# Patient Record
Sex: MALE | Race: WHITE
[De-identification: names, ages, dates, MRNs, and addresses within clinical notes are randomized per-mention and may not be internally consistent; named-entity substitution may affect disease eponyms.]

---

## 2019-07-30 ENCOUNTER — APPOINTMENT (OUTPATIENT)
Dept: INTERNAL MEDICINE | Facility: CLINIC | Age: 34
End: 2019-07-30
Payer: MEDICAID

## 2019-07-30 VITALS
HEART RATE: 78 BPM | TEMPERATURE: 98.5 F | SYSTOLIC BLOOD PRESSURE: 110 MMHG | HEIGHT: 70 IN | WEIGHT: 174 LBS | OXYGEN SATURATION: 95 % | BODY MASS INDEX: 24.91 KG/M2 | RESPIRATION RATE: 14 BRPM | DIASTOLIC BLOOD PRESSURE: 60 MMHG

## 2019-07-30 DIAGNOSIS — Z82.49 FAMILY HISTORY OF ISCHEMIC HEART DISEASE AND OTHER DISEASES OF THE CIRCULATORY SYSTEM: ICD-10-CM

## 2019-07-30 PROCEDURE — 99204 OFFICE O/P NEW MOD 45 MIN: CPT

## 2019-07-30 RX ORDER — CLONIDINE HYDROCHLORIDE 0.2 MG/1
0.2 TABLET ORAL
Qty: 60 | Refills: 1 | Status: ACTIVE | COMMUNITY

## 2019-07-30 RX ORDER — BUPRENORPHINE HYDROCHLORIDE, NALOXONE HYDROCHLORIDE 8; 2 MG/1; MG/1
8-2 FILM, SOLUBLE BUCCAL; SUBLINGUAL
Qty: 90 | Refills: 0 | Status: ACTIVE | COMMUNITY

## 2019-07-30 RX ORDER — CLONAZEPAM 1 MG/1
1 TABLET ORAL TWICE DAILY
Qty: 60 | Refills: 0 | Status: ACTIVE | COMMUNITY

## 2019-07-30 NOTE — HEALTH RISK ASSESSMENT
[Good] : ~his/her~ current health as good [] : Yes [No falls in past year] : Patient reported no falls in the past year [No] : In the past 12 months have you used drugs other than those required for medical reasons? No [0] : 1) Little interest or pleasure doing things: Not at all (0)

## 2019-07-30 NOTE — PHYSICAL EXAM
[Well Nourished] : well nourished [No Acute Distress] : no acute distress [Well Developed] : well developed [Well-Appearing] : well-appearing [PERRL] : pupils equal round and reactive to light [EOMI] : extraocular movements intact [Normal Sclera/Conjunctiva] : normal sclera/conjunctiva [No JVD] : no jugular venous distention [Normal Oropharynx] : the oropharynx was normal [Normal Outer Ear/Nose] : the outer ears and nose were normal in appearance [Thyroid Normal, No Nodules] : the thyroid was normal and there were no nodules present [No Lymphadenopathy] : no lymphadenopathy [Supple] : supple [Clear to Auscultation] : lungs were clear to auscultation bilaterally [No Accessory Muscle Use] : no accessory muscle use [No Respiratory Distress] : no respiratory distress  [Normal Rate] : normal rate  [Regular Rhythm] : with a regular rhythm [No Murmur] : no murmur heard [No Carotid Bruits] : no carotid bruits [Normal S1, S2] : normal S1 and S2 [No Varicosities] : no varicosities [No Abdominal Bruit] : a ~M bruit was not heard ~T in the abdomen [No Palpable Aorta] : no palpable aorta [No Edema] : there was no peripheral edema [Pedal Pulses Present] : the pedal pulses are present [No Extremity Clubbing/Cyanosis] : no extremity clubbing/cyanosis [Non Tender] : non-tender [Soft] : abdomen soft [No Masses] : no abdominal mass palpated [No HSM] : no HSM [Non-distended] : non-distended [Normal Bowel Sounds] : normal bowel sounds [Normal Anterior Cervical Nodes] : no anterior cervical lymphadenopathy [Normal Posterior Cervical Nodes] : no posterior cervical lymphadenopathy [No Joint Swelling] : no joint swelling [No Spinal Tenderness] : no spinal tenderness [No CVA Tenderness] : no CVA  tenderness [Grossly Normal Strength/Tone] : grossly normal strength/tone [No Rash] : no rash [Normal Gait] : normal gait [Coordination Grossly Intact] : coordination grossly intact [No Focal Deficits] : no focal deficits [Deep Tendon Reflexes (DTR)] : deep tendon reflexes were 2+ and symmetric [Normal Insight/Judgement] : insight and judgment were intact [Normal Affect] : the affect was normal

## 2019-07-31 ENCOUNTER — APPOINTMENT (OUTPATIENT)
Dept: INTERNAL MEDICINE | Facility: CLINIC | Age: 34
End: 2019-07-31
Payer: MEDICAID

## 2019-07-31 VITALS
WEIGHT: 180 LBS | BODY MASS INDEX: 25.77 KG/M2 | HEART RATE: 74 BPM | OXYGEN SATURATION: 97 % | HEIGHT: 70 IN | SYSTOLIC BLOOD PRESSURE: 134 MMHG | DIASTOLIC BLOOD PRESSURE: 82 MMHG | RESPIRATION RATE: 16 BRPM

## 2019-07-31 DIAGNOSIS — F19.10 OTHER PSYCHOACTIVE SUBSTANCE ABUSE, UNCOMPLICATED: ICD-10-CM

## 2019-07-31 DIAGNOSIS — N52.9 MALE ERECTILE DYSFUNCTION, UNSPECIFIED: ICD-10-CM

## 2019-07-31 DIAGNOSIS — F41.9 ANXIETY DISORDER, UNSPECIFIED: ICD-10-CM

## 2019-07-31 DIAGNOSIS — F32.9 ANXIETY DISORDER, UNSPECIFIED: ICD-10-CM

## 2019-07-31 PROCEDURE — 99214 OFFICE O/P EST MOD 30 MIN: CPT

## 2019-07-31 NOTE — COUNSELING
[de-identified] : Advised connecting with [facility] to try to get psych referral / appmt\par Previously patient [place] (and elsewhere)   advised reconnecting there as possible source of help to him...

## 2019-07-31 NOTE — HISTORY OF PRESENT ILLNESS
[FreeTextEntry1] : Here today looking for physician to prescribe for his anxiety / depression\par \par \par  [de-identified] : Long hx   multi substance abuse disorder\par On suboxone via program ([facility]) x 8 mos\par    drug tests 2x / w\par     MD Guerrero\par     Therapist / counselor "Ginger" \par \par Reports generalized anxiety x yrs   on meds\par "Can't find psychiatrist" \par Anticipates program he is in will "detox" "withdraw" bernzos he is taking

## 2022-07-09 ENCOUNTER — EMERGENCY (EMERGENCY)
Facility: HOSPITAL | Age: 37
LOS: 1 days | Discharge: HOME | End: 2022-07-09
Attending: STUDENT IN AN ORGANIZED HEALTH CARE EDUCATION/TRAINING PROGRAM
Payer: SELF-PAY

## 2022-07-09 ENCOUNTER — EMERGENCY (EMERGENCY)
Facility: HOSPITAL | Age: 37
LOS: 1 days | Discharge: HOME | End: 2022-07-09
Attending: EMERGENCY MEDICINE
Payer: SELF-PAY

## 2022-07-09 VITALS
DIASTOLIC BLOOD PRESSURE: 83 MMHG | RESPIRATION RATE: 18 BRPM | SYSTOLIC BLOOD PRESSURE: 127 MMHG | OXYGEN SATURATION: 99 % | HEART RATE: 54 BPM

## 2022-07-09 VITALS
OXYGEN SATURATION: 97 % | HEIGHT: 71 IN | HEART RATE: 59 BPM | WEIGHT: 169.98 LBS | RESPIRATION RATE: 16 BRPM | DIASTOLIC BLOOD PRESSURE: 24 MMHG | TEMPERATURE: 99 F | SYSTOLIC BLOOD PRESSURE: 144 MMHG

## 2022-07-09 VITALS
WEIGHT: 164.91 LBS | HEART RATE: 66 BPM | SYSTOLIC BLOOD PRESSURE: 125 MMHG | HEIGHT: 71 IN | TEMPERATURE: 98 F | RESPIRATION RATE: 16 BRPM | OXYGEN SATURATION: 98 % | DIASTOLIC BLOOD PRESSURE: 73 MMHG

## 2022-07-09 PROCEDURE — 93010 ELECTROCARDIOGRAM REPORT: CPT

## 2022-07-09 PROCEDURE — 82962 GLUCOSE BLOOD TEST: CPT

## 2022-07-09 PROCEDURE — 93005 ELECTROCARDIOGRAM TRACING: CPT

## 2022-07-09 PROCEDURE — 99285 EMERGENCY DEPT VISIT HI MDM: CPT

## 2022-07-09 PROCEDURE — 99284 EMERGENCY DEPT VISIT MOD MDM: CPT

## 2022-07-09 RX ORDER — ONDANSETRON 8 MG/1
4 TABLET, FILM COATED ORAL ONCE
Refills: 0 | Status: COMPLETED | OUTPATIENT
Start: 2022-07-09 | End: 2022-07-09

## 2022-07-09 RX ORDER — METHADONE HYDROCHLORIDE 40 MG/1
10 TABLET ORAL ONCE
Refills: 0 | Status: DISCONTINUED | OUTPATIENT
Start: 2022-07-09 | End: 2022-07-10

## 2022-07-09 RX ORDER — SODIUM CHLORIDE 9 MG/ML
1000 INJECTION INTRAMUSCULAR; INTRAVENOUS; SUBCUTANEOUS ONCE
Refills: 0 | Status: COMPLETED | OUTPATIENT
Start: 2022-07-09 | End: 2022-07-09

## 2022-07-09 RX ORDER — METHADONE HYDROCHLORIDE 40 MG/1
10 TABLET ORAL ONCE
Refills: 0 | Status: DISCONTINUED | OUTPATIENT
Start: 2022-07-09 | End: 2022-07-09

## 2022-07-09 RX ADMIN — METHADONE HYDROCHLORIDE 10 MILLIGRAM(S): 40 TABLET ORAL at 03:28

## 2022-07-09 NOTE — ED ADULT NURSE NOTE - NSIMPLEMENTINTERV_GEN_ALL_ED
Implemented All Universal Safety Interventions:  [place] to call system. Call bell, personal items and telephone within reach. Instruct patient to call for assistance. Room bathroom lighting operational. Non-slip footwear when patient is off stretcher. Physically safe environment: no spills, clutter or unnecessary equipment. Stretcher in lowest position, wheels locked, appropriate side rails in place.

## 2022-07-09 NOTE — ED PROVIDER NOTE - NSFOLLOWUPINSTRUCTIONS_ED_ALL_ED_FT
Opioid Use Disorder      Opioid use disorder is a condition in which opioids are used for reasons other than medical care. The person may use them even though taking them hurts the person's health and well-being. These drugs are powerful substances that relieve pain. Opioids include drugs such as heroin as well as prescription medicines for pain, such as:  •Codeine.      •Morphine.       •Hydrocodone.      •Oxycodone.       •Fentanyl.      Taking prescribed opioids regularly can lead to dependence, especially if you take them in larger amounts or more often than they should be taken. Opioid use disorder can lead to problems with mental and physical health, including:  •Depression or anxiety.       •Severe constipation.       •Malnutrition and weight loss.       •Sleep problems.       •Diseases caused by infections, such as hepatitis or HIV.      •Sexual problems.      Opioid use disorder can be dangerous. It increases the risk of suicide and can lead to a life-threatening overdose.      What are the causes?    This condition is caused by taking opioids. Taking opioids again and again results in changes in the brain that make it hard to control opioid use. Many people develop this condition because they like the way they feel when they take opioids or because they get addicted to them.      What increases the risk?    This condition is more likely to develop in people who:  •Have a family history of opioid use disorder.      •Misuse other drugs.      •Have a mental illness, such as depression, post-traumatic stress disorder, or antisocial personality disorder.      •Begin use at an early age, such as during their teenage years.        What are the signs or symptoms?    Symptoms of this condition include:  •Taking opioids in larger amounts or for longer periods than you want to.      •Spending an abnormal amount of time getting opioids, using them, or recovering from their effects.      •Craving opioids.      •Using opioids in a way that interferes with work, school, social activities, and personal relationships.      •Giving up or cutting down on important life activities because of opioid use.      •Using opioids when it is dangerous, such as when driving a car.    •Continuing to use the drug even after it has led to problems such as:  •Physical or mental health problems.       •Legal or financial troubles.       •Job loss.       •Broken relationships.        •Being unable to slow down or stop your use of the drug.      •Needing more and more of an opioid to get the same effect (building up a tolerance).    •Experiencing unpleasant symptoms if you do not use the opioid (withdrawal). Some symptoms of withdrawal include:  •Depression, anxiety, or feeling irritable.      •Nausea or vomiting.      •Muscle aches or spasms.      •Watery eyes.      •Trouble sleeping.      •Yawning.          How is this diagnosed?    This condition is diagnosed based on:  •A physical exam.       •Your history of opioid use.     •Your symptoms. This includes:   •How opioid use affects your life.      •Changes in personality, behaviors, and mood.      •Having at least two symptoms of opioid use disorder within a 12-month period.      •Health issues related to using opioids.        •Blood or urine tests to screen for drugs.        How is this treated?  Person talking with a counselor.   The first goal of treatment is to stop your use of opioids. This must be done safely and may involve taking medicines to lessen withdrawal symptoms. Treatment may also involve:  •Taking part in group and individual counseling from mental health providers who have experience with substance use disorder.       •Staying at a residential treatment center for several days or weeks.       •Attending daily counseling sessions at a treatment center.     •Taking medicines as told by your health care provider that:   •Ease symptoms and prevent complications during withdrawal.       •Block cravings and block the good feeling that you get from using opioids.       •Treat other mental health issues, such as depression or anxiety.       •Reduce agitation.         •Participating in a support group to share your experience with others who are going through the same thing.       •Using opioid maintenance treatment. This involves taking certain kinds of opioid medicines. These medicines satisfy cravings but are safer than opioids that are commonly misused.      Recovery can be a long process. Some people who undergo treatment start using opioids again after stopping (relapse). If you relapse, it does not mean that treatment will not work.      Follow these instructions at home:    Medicines     •Take over-the-counter and prescription medicines only as told by your health care provider.      •Check with your health care provider before starting any new medicines, herbs, or supplements.      General instructions     • Do not use any drugs or alcohol.      •Avoid people and activities that trigger your use of opioids.       •Learn and practice techniques for managing stress.       •Have a plan for vulnerable moments. These are times when you are most likely to relapse. Get phone numbers of those who are willing to help and who are committed to your recovery.      •Attend support groups regularly. These groups provide emotional support, advice, and guidance.       •Keep all follow-up visits. This is important. Follow-up visits include continuing to work with therapists and support groups.        Where to find more information    •National [place] on Drug Abuse: drugabuse.gov      •Substance Abuse and Mental Health Services Administration: samhsa.gov      •Narcotics Anonymous: na.org        Contact a health care provider if:    •You cannot take your medicines as told.      •Your symptoms get worse.      •You have a relapse.        Get help right away if:  •You may have taken too much of an opioid (overdosed). Common symptoms of an overdose include:  •Sleepiness or difficulty waking from sleep.       •Decrease in attention or confusion.      •Slurred speech.       •Slowed breathing and a slow pulse (bradycardia).       •Nausea and vomiting.       •Abnormally small pupils.        •You have serious thoughts about hurting yourself or others.      These symptoms may represent a serious problem that is an emergency. Do not wait to see if the symptoms will go away. Get medical help right away. Call your local emergency services (911 in the [place] Do not drive yourself to the hospital.     If you were prescribed a drug (naloxone) that reverses the effects of an opioid overdose, a friend, family member, or emergency services provider can administer the drug in an emergency.     If you ever feel like you may hurt yourself or others, or have thoughts about taking your own life, get help right away. You can go to your nearest emergency department or call:   • Your local emergency services (911 in the [place]       • A suicide crisis helpline, such as the National Suicide Prevention Lifeline at 1-921.263.2177. This is open 24 hours a day.         Summary    •Opioid use disorder is a condition in which opioids are used for reasons other than medical care.      •Opioid use disorder can be dangerous. It can lead to various mental and physical problems, and an opioid overdose can be life-threatening.      •The first goal of treatment is to stop your use of opioids. This must be done safely and may involve taking medicines to lessen withdrawal symptoms.      This information is not intended to replace advice given to you by your health care provider. Make sure you discuss any questions you have with your health care provider.      Document Revised: 03/30/2022 Document Reviewed: 03/30/2022    Elsevier Patient Education © 2022 Elsevier Inc.

## 2022-07-09 NOTE — ED ADULT TRIAGE NOTE - CHIEF COMPLAINT QUOTE
accompanied by 2 police officers with c/o coccaine use, under police custody [address], handcuff and foot cuff

## 2022-07-09 NOTE — ED PROVIDER NOTE - OBJECTIVE STATEMENT
37 y/o male, p/w concern for withdrawal. Patient under arrest. Patient states that he has not had his usual opioid/cocaine in the past 24 hours. Patient endorses chills, nausea, and fatigue. Patient endorses it feels like opioid withdrawal. No

## 2022-07-09 NOTE — ED PROVIDER NOTE - PATIENT PORTAL LINK FT
You can access the FollowMyHealth Patient Portal offered by [facility] by registering at the following website: [email or web address]. By joining [employer]’s FollowMyHealth portal, you will also be able to view your health information using other applications (apps) compatible with our system.

## 2022-07-09 NOTE — ED PROVIDER NOTE - PATIENT PORTAL LINK FT
You can access the FollowMyHealth Patient Portal offered by [facility] by registering at the following website: [email or web address]. By joining [employer]’s FollowMyHealth portal, you will also be able to view your health information using other applications (apps) compatible with our system.

## 2022-07-09 NOTE — ED ADULT TRIAGE NOTE - CHIEF COMPLAINT QUOTE
from [address], accompanied by [facility] officer Bobby Oliva#[id number], pt c/o heroin withdrawal, last use 2 days ago, pt vomiting and feels weak

## 2022-07-09 NOTE — ED PROVIDER NOTE - CLINICAL SUMMARY MEDICAL DECISION MAKING FREE TEXT BOX
Patient presenting with symptoms of opioid withdrawal. vital stable. will give med for symptomatic treatment, return precautions and instruction f.u pmd. patient otherwise clinically stable.

## 2022-07-09 NOTE — ED ADULT NURSE NOTE - OBJECTIVE STATEMENT
Patient presented to the ED with c/o cocaine withdrawal, under police custody [address], handcuff and shackles to b/l ankles. No tremors, nausea, palpitations, perspirations noted.

## 2022-07-09 NOTE — ED PROVIDER NOTE - OBJECTIVE STATEMENT
36 year old male with no pertinent PHMx who is in police custody presents to the ED with complaints of opioid withdraw. Patient states he was seen in ED for the same yesterday. Patient states still feeling nauseated. NKDA.

## 2022-07-10 VITALS
DIASTOLIC BLOOD PRESSURE: 75 MMHG | SYSTOLIC BLOOD PRESSURE: 143 MMHG | HEART RATE: 55 BPM | TEMPERATURE: 99 F | RESPIRATION RATE: 18 BRPM | OXYGEN SATURATION: 99 %

## 2022-07-10 LAB
ALBUMIN SERPL ELPH-MCNC: 3.7 G/DL — SIGNIFICANT CHANGE UP (ref 3.5–5)
ALP SERPL-CCNC: 87 U/L — SIGNIFICANT CHANGE UP (ref 40–120)
ALT FLD-CCNC: 18 U/L DA — SIGNIFICANT CHANGE UP (ref 10–60)
ANION GAP SERPL CALC-SCNC: 12 MMOL/L — SIGNIFICANT CHANGE UP (ref 5–17)
AST SERPL-CCNC: 16 U/L — SIGNIFICANT CHANGE UP (ref 10–40)
BASOPHILS # BLD AUTO: 0.04 K/UL — SIGNIFICANT CHANGE UP (ref 0–0.2)
BASOPHILS NFR BLD AUTO: 0.3 % — SIGNIFICANT CHANGE UP (ref 0–2)
BILIRUB SERPL-MCNC: 0.5 MG/DL — SIGNIFICANT CHANGE UP (ref 0.2–1.2)
BUN SERPL-MCNC: 12 MG/DL — SIGNIFICANT CHANGE UP (ref 7–18)
CALCIUM SERPL-MCNC: 9.5 MG/DL — SIGNIFICANT CHANGE UP (ref 8.4–10.5)
CHLORIDE SERPL-SCNC: 106 MMOL/L — SIGNIFICANT CHANGE UP (ref 96–108)
CO2 SERPL-SCNC: 21 MMOL/L — LOW (ref 22–31)
CREAT SERPL-MCNC: 0.8 MG/DL — SIGNIFICANT CHANGE UP (ref 0.5–1.3)
EGFR: 118 ML/MIN/1.73M2 — SIGNIFICANT CHANGE UP
EOSINOPHIL # BLD AUTO: 0.02 K/UL — SIGNIFICANT CHANGE UP (ref 0–0.5)
EOSINOPHIL NFR BLD AUTO: 0.1 % — SIGNIFICANT CHANGE UP (ref 0–6)
GLUCOSE SERPL-MCNC: 110 MG/DL — HIGH (ref 70–99)
HCT VFR BLD CALC: 45.4 % — SIGNIFICANT CHANGE UP (ref 39–50)
HGB BLD-MCNC: 15 G/DL — SIGNIFICANT CHANGE UP (ref 13–17)
IMM GRANULOCYTES NFR BLD AUTO: 0.4 % — SIGNIFICANT CHANGE UP (ref 0–1.5)
LYMPHOCYTES # BLD AUTO: 1.21 K/UL — SIGNIFICANT CHANGE UP (ref 1–3.3)
LYMPHOCYTES # BLD AUTO: 8.6 % — LOW (ref 13–44)
MCHC RBC-ENTMCNC: 28.1 PG — SIGNIFICANT CHANGE UP (ref 27–34)
MCHC RBC-ENTMCNC: 33 GM/DL — SIGNIFICANT CHANGE UP (ref 32–36)
MCV RBC AUTO: 85.2 FL — SIGNIFICANT CHANGE UP (ref 80–100)
MONOCYTES # BLD AUTO: 0.63 K/UL — SIGNIFICANT CHANGE UP (ref 0–0.9)
MONOCYTES NFR BLD AUTO: 4.5 % — SIGNIFICANT CHANGE UP (ref 2–14)
NEUTROPHILS # BLD AUTO: 12.19 K/UL — HIGH (ref 1.8–7.4)
NEUTROPHILS NFR BLD AUTO: 86.1 % — HIGH (ref 43–77)
NRBC # BLD: 0 /100 WBCS — SIGNIFICANT CHANGE UP (ref 0–0)
PLATELET # BLD AUTO: 219 K/UL — SIGNIFICANT CHANGE UP (ref 150–400)
POTASSIUM SERPL-MCNC: 4.3 MMOL/L — SIGNIFICANT CHANGE UP (ref 3.5–5.3)
POTASSIUM SERPL-SCNC: 4.3 MMOL/L — SIGNIFICANT CHANGE UP (ref 3.5–5.3)
PROT SERPL-MCNC: 7.6 G/DL — SIGNIFICANT CHANGE UP (ref 6–8.3)
RBC # BLD: 5.33 M/UL — SIGNIFICANT CHANGE UP (ref 4.2–5.8)
RBC # FLD: 13.1 % — SIGNIFICANT CHANGE UP (ref 10.3–14.5)
SODIUM SERPL-SCNC: 139 MMOL/L — SIGNIFICANT CHANGE UP (ref 135–145)
WBC # BLD: 14.14 K/UL — HIGH (ref 3.8–10.5)
WBC # FLD AUTO: 14.14 K/UL — HIGH (ref 3.8–10.5)

## 2022-07-10 PROCEDURE — 99285 EMERGENCY DEPT VISIT HI MDM: CPT | Mod: 25

## 2022-07-10 PROCEDURE — 36415 COLL VENOUS BLD VENIPUNCTURE: CPT

## 2022-07-10 PROCEDURE — 85025 COMPLETE CBC W/AUTO DIFF WBC: CPT

## 2022-07-10 PROCEDURE — 96374 THER/PROPH/DIAG INJ IV PUSH: CPT

## 2022-07-10 PROCEDURE — 80053 COMPREHEN METABOLIC PANEL: CPT

## 2022-07-10 RX ORDER — METHADONE HYDROCHLORIDE 40 MG/1
10 TABLET ORAL ONCE
Refills: 0 | Status: DISCONTINUED | OUTPATIENT
Start: 2022-07-10 | End: 2022-07-10

## 2022-07-10 RX ADMIN — ONDANSETRON 4 MILLIGRAM(S): 8 TABLET, FILM COATED ORAL at 00:11

## 2022-07-10 RX ADMIN — SODIUM CHLORIDE 1000 MILLILITER(S): 9 INJECTION INTRAMUSCULAR; INTRAVENOUS; SUBCUTANEOUS at 00:11

## 2022-07-10 RX ADMIN — METHADONE HYDROCHLORIDE 10 MILLIGRAM(S): 40 TABLET ORAL at 01:08

## 2022-07-10 NOTE — ED ADULT NURSE NOTE - OBJECTIVE STATEMENT
Pt presents to the ED under [facility] custody with c/o heroin withdrawal, with nausea and vomiting.

## 2022-07-10 NOTE — ED ADULT NURSE NOTE - NS ED NOTE ABUSE RESPONSE YN
Yes 03-29    140  |  106  |  10  ----------------------------<  92  4.0   |  28  |  1.02    Ca    8.6      29 Mar 2021 08:16  Mg     1.9     03-29    TPro  7.8  /  Alb  3.1<L>  /  TBili  0.5  /  DBili  x   /  AST  16  /  ALT  13  /  AlkPhos  63  03-29

## 2022-07-10 NOTE — ED ADULT NURSE NOTE - CHIEF COMPLAINT QUOTE
from [address], accompanied by [facility] officer Bobby Oliva#[id number], pt c/o heroin withdrawal, last use 2 days ago, pt vomiting and feels weak

## 2022-08-02 ENCOUNTER — EMERGENCY (EMERGENCY)
Facility: HOSPITAL | Age: 37
LOS: 0 days | Discharge: HOME | End: 2022-08-03
Attending: EMERGENCY MEDICINE
Payer: MEDICAID

## 2022-08-02 VITALS
SYSTOLIC BLOOD PRESSURE: 107 MMHG | RESPIRATION RATE: 17 BRPM | OXYGEN SATURATION: 95 % | WEIGHT: 139.99 LBS | DIASTOLIC BLOOD PRESSURE: 68 MMHG | HEIGHT: 68 IN | TEMPERATURE: 98 F | HEART RATE: 64 BPM

## 2022-08-02 DIAGNOSIS — S09.90XA UNSPECIFIED INJURY OF HEAD, INITIAL ENCOUNTER: ICD-10-CM

## 2022-08-02 DIAGNOSIS — T40.5X1A POISONING BY COCAINE, ACCIDENTAL (UNINTENTIONAL), INITIAL ENCOUNTER: ICD-10-CM
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
## 2022-08-02 DIAGNOSIS — R41.82 ALTERED MENTAL STATUS, UNSPECIFIED: ICD-10-CM

## 2022-08-02 DIAGNOSIS — S00.212A ABRASION OF LEFT EYELID AND PERIOCULAR AREA, INITIAL ENCOUNTER: ICD-10-CM

## 2022-08-02 PROCEDURE — 96372 THER/PROPH/DIAG INJ SC/IM: CPT

## 2022-08-02 PROCEDURE — 72125 CT NECK SPINE W/O DYE: CPT | Mod: MG

## 2022-08-02 PROCEDURE — 99285 EMERGENCY DEPT VISIT HI MDM: CPT | Mod: 25

## 2022-08-02 PROCEDURE — 99285 EMERGENCY DEPT VISIT HI MDM: CPT

## 2022-08-02 PROCEDURE — 70450 CT HEAD/BRAIN W/O DYE: CPT | Mod: MG

## 2022-08-02 PROCEDURE — G1004: CPT

## 2022-08-02 NOTE — ED PROVIDER NOTE - PROGRESS NOTE DETAILS
KV: s/o pending re-evaluation for sobriety. patient with clear speech. will do ambulation trial and discharge if able to ambulate with steady gait.

## 2022-08-02 NOTE — ED ADULT TRIAGE NOTE - CHIEF COMPLAINT QUOTE
[place] falls. patient states he used crack and dope this evening. patient unsure if he blacked out. abrasion present to hairline. no bleeding at this time. no other abrasions or deformities noted at this time.

## 2022-08-02 NOTE — ED PROVIDER NOTE - NSFOLLOWUPINSTRUCTIONS_ED_ALL_ED_FT
Please refrain from doing drugs in excess.  Please follow up with your Primary Care Physician and any specialists as discussed.  If your symptoms persist or worsen, please seek care. Either return to the Emergency Department, go to urgent care or see your primary care doctor.  See refer to general information and follow up instructions below:     Illegal Drug Use Information, Adult      Illegal drugs are chemicals and substances that are illegal to use, sell, or have (possess). Health care providers and pharmacies do not use or carry these types of drugs to treat medical problems because they can cause serious side effects and can lead to death. Examples of illegal drugs include:  •Cocaine or crack.      •Meth (methamphetamine) or crystal meth.      •"Bath salts" (synthetic cathinones).      •Heroin.      •LSD or acid.      •PCP (phencyclidine).      •Ecstasy.        What is drug dependence?    Using illegal drugs often leads to dependence or addiction. When you use certain drugs over a long period of time, your brain chemistry changes so that you can no longer function normally without that drug. This is called drug dependence. Drug dependence can cause you to:  •Have unpleasant feelings and physical problems when you stop using the drug (withdrawal).      •Be unable to perform at work or at home, or do the activities you used to do, without using the drug.      Some drugs make people feel so good that they want to use the drug again and again. This is called drug addiction. People who are addicted spend a lot of time seeking out the drug so that they can get the feeling they want from it. Addiction and dependence can be very hard to overcome.      How can illegal drug use and dependence affect me?    Using an illegal drug only once can have a major impact on your life. It is possible to die from side effects after using a drug just once. If you use an illegal drug repeatedly, you may need to take larger and larger doses of the drug to experience the feelings you want. Drug dependency and addiction may lead to:  •Being unable to care for yourself and others.      •Trouble with finances due to using your money for drugs.      •Withdrawal, if you stop using the drug.      •Negative effects on your relationships and work performance. It causes others not to trust you. If you have children, you could lose custody of them.    •Behavior problems, such as:   •Behaving in ways that do not match your values.       •Lying and crime, such as stealing.        •long-term or [facility]. This can affect your ability to find a good job or continue your education.      •Health problems such as tooth loss, skin problems, heart and lung disease, and stomach problems.      •A drug overdose. This is a dangerous situation that requires hospitalization and often leads to death.    •If you are a woman, you may have problems with your pregnancy, including:  •Losing the pregnancy early (miscarriage), early delivery (premature birth), or delivering a lifeless infant (stillbirth).      •Slow or abnormal growth (birth defects) of your unborn child.      •Giving birth to a  who is addicted to illegal drugs.          What actions can I take to avoid illegal drug use?  Two people talking with a counselor.   To avoid using illegal drugs:  •Find healthy ways to cope with stress, such as exercise, meditation, or spending time with family and friends. Talk with your health care provider about how you feel and how to cope with stress.      •Spend time with people who do not use illegal drugs, or make new friends who do not use drugs.      •Do something else instead of using drugs. You can exercise, take up a hobby, or participate in activities that you can do with others.      •Do not be afraid to say no if someone offers you an illegal drug. Speak up about why you do not want to use drugs. You can be a positive role model for others.      •Work with a health care provider or counselor to create a program for yourself to help you deal with various aspects of drug use or addiction.        Where to find more information    You can find more information about illegal drug use, dependence, and addiction from:  •Your health care provider or mental health counselor.      •Narcotics Anonymous: www.na.org    •Substance Abuse and Mental Health Services Administration (SAMHSA):  •Treatment finder: https://www.samhsa.gov/find-help      •National helpline: 6-420-769-IUBE (0233)          Contact a health care provider if:    •You use illegal drugs and you want help to change your addictive behavior.      •You lose interest in things you used to enjoy, like hobbies or family activities.      •Your eating or sleeping habits change as a result of drug use.      •You use medicine to get the same effects as a drug. This is illegal use and can become addictive as well.      •You stopped illegal drug use previously and you start actively using it again (relapse).        Get help right away if:    •You have thoughts about hurting yourself or others.      If you ever feel like you may hurt yourself or others, or have thoughts about taking your own life, get help right away. Go to your nearest emergency department or:   • Call your local emergency services (911 in the [place]        • Call a suicide crisis helpline, such as the National Suicide Prevention Lifeline at 1-639.225.8341. This is open 24 hours a day in the [place]       • Text the Crisis Text Line at 679957 (in the [place]         Summary    •Illegal drugs are chemicals and substances that are illegal to use, sell, or possess.      •Using illegal drugs often leads to dependence or addiction. Addiction and dependence can be very hard to overcome.      •If you use illegal drugs, look for resources to help you quit and find healthy ways to cope with stress.      This information is not intended to replace advice given to you by your health care provider. Make sure you discuss any questions you have with your health care provider.

## 2022-08-02 NOTE — ED PROVIDER NOTE - OBJECTIVE STATEMENT
35 y/o male in ED s/p drug use with multiple falls witnessed.   as per EMS[place]ers [place] 911 due to pt falling at station.   pt states that he used crack and pot tonight.   pt denies any complaints.

## 2022-08-02 NOTE — ED PROVIDER NOTE - PATIENT PORTAL LINK FT
You can access the FollowMyHealth Patient Portal offered by [facility] by registering at the following website: [email or web address]. By joining [employer]’s FollowMyHealth portal, you will also be able to view your health information using other applications (apps) compatible with our system.

## 2022-08-03 VITALS
TEMPERATURE: 98 F | HEART RATE: 72 BPM | RESPIRATION RATE: 18 BRPM | DIASTOLIC BLOOD PRESSURE: 81 MMHG | SYSTOLIC BLOOD PRESSURE: 122 MMHG | OXYGEN SATURATION: 97 %

## 2022-08-03 PROCEDURE — 70450 CT HEAD/BRAIN W/O DYE: CPT | Mod: 26,MG

## 2022-08-03 PROCEDURE — 72125 CT NECK SPINE W/O DYE: CPT | Mod: 26,MG

## 2022-08-03 PROCEDURE — G1004: CPT

## 2022-08-03 RX ADMIN — Medication 2 MILLIGRAM(S): at 00:37

## 2022-08-03 NOTE — ED ADULT NURSE REASSESSMENT NOTE - NS ED NURSE REASSESS COMMENT FT1
Pt brought to CT scan. Once on table, patient began cry, screaming, attempting to jump off CT table refusing scan. Pt deescalated with verbal redirection and brought back to stretcher. Dr. Arita made aware. Medication orders to follow.

## 2022-08-03 NOTE — ED ADULT NURSE REASSESSMENT NOTE - NS ED NURSE REASSESS COMMENT FT1
patient ambulated with steady gait.  security at bedside to escort patient. patient ambulated with steady gait.  patient provided with voucher as per Yfn Guerra.  security at bedside to escort patient.

## 2022-08-03 NOTE — ED ADULT NURSE REASSESSMENT NOTE - NS ED NURSE REASSESS COMMENT FT1
received report from ANA Laws.  patient asleep in stretcher in lowest locked position in view of nurses station.  yellow gown on.  respirations equal and unlabored.  patient arousable to voice and touch.  pending sobriety.

## 2022-09-27 ENCOUNTER — EMERGENCY (EMERGENCY)
Facility: HOSPITAL | Age: 37
LOS: 1 days | Discharge: HOME | End: 2022-09-27
Attending: EMERGENCY MEDICINE
Payer: SELF-PAY

## 2022-09-27 VITALS
HEIGHT: 71 IN | SYSTOLIC BLOOD PRESSURE: 107 MMHG | HEART RATE: 69 BPM | DIASTOLIC BLOOD PRESSURE: 58 MMHG | RESPIRATION RATE: 10 BRPM | OXYGEN SATURATION: 98 %

## 2022-09-27 VITALS
SYSTOLIC BLOOD PRESSURE: 106 MMHG | OXYGEN SATURATION: 98 % | TEMPERATURE: 98 F | HEART RATE: 67 BPM | DIASTOLIC BLOOD PRESSURE: 62 MMHG | RESPIRATION RATE: 16 BRPM

## 2022-09-27 PROBLEM — Z78.9 OTHER SPECIFIED HEALTH STATUS: Chronic | Status: ACTIVE | Noted: 2022-07-10

## 2022-09-27 PROCEDURE — 99283 EMERGENCY DEPT VISIT LOW MDM: CPT

## 2022-09-27 PROCEDURE — 99284 EMERGENCY DEPT VISIT MOD MDM: CPT

## 2022-09-27 RX ORDER — ONDANSETRON 8 MG/1
4 TABLET, FILM COATED ORAL ONCE
Refills: 0 | Status: COMPLETED | OUTPATIENT
Start: 2022-09-27 | End: 2022-09-27

## 2022-09-27 RX ADMIN — ONDANSETRON 4 MILLIGRAM(S): 8 TABLET, FILM COATED ORAL at 16:41

## 2022-09-27 RX ADMIN — Medication 1 MILLIGRAM(S): at 16:42

## 2022-09-27 NOTE — ED PROVIDER NOTE - OBJECTIVE STATEMENT
37 y.o. male currently under [facility] custody, pt last used crack today, BIBA c/o note feeling good, chills, sweating, vomited x4, pt's not on Methadone. 37 y.o. male currently under [facility] custody for shoplifting, pt last used crack today, BIBA c/o note feeling good, chills, sweating, vomited x4, pt's not on Methadone.

## 2022-09-27 NOTE — CHART NOTE - NSCHARTNOTEFT_GEN_A_CORE
Pt is a 37 year-old male who was brought to the ED in the company of [employer] with the complaint of  vomiting.  Pt screened positive for sbirt but could not participate in screening due to acuity of illness. Sbirt screen completed in sunrise. Physician updated.

## 2022-09-27 NOTE — ED PROVIDER NOTE - PATIENT PORTAL LINK FT
You can access the FollowMyHealth Patient Portal offered by [facility] by registering at the following website: [email or web address]. By joining [employer]’s FollowMyHealth portal, you will also be able to view your health information using other applications (apps) compatible with our system.

## 2024-05-22 NOTE — ED ADULT TRIAGE NOTE - CHIEF COMPLAINT QUOTE
rachelle under police custody c/o withdrawal from heroin c/o vomiting , general malaise Gen: NAD, appears uncomfortable   HEENT: MMM, Throat clear, PERRLA, EOMI  Heart: S1S2+, RRR, no murmur  Lungs: CTAB  Abd: soft, tender in the epigastric region, ND, BSP, no HSM  Ext: FROM  Neuro: 2+ reflexes b/l, wnl Gen: NAD, appears uncomfortable   HEENT: MMM, Throat clear, PERRLA, EOMI  Heart: S1S2+, RRR, no murmur  Lungs: CTAB  Abd: soft, tender in the epigastric region, ND, BSP, no HSM;  No right lower quadrant or lower abdominal tenderness  Ext: FROM  Neuro: 2+ reflexes b/l, wnl